# Patient Record
Sex: FEMALE | Race: WHITE | URBAN - METROPOLITAN AREA
[De-identification: names, ages, dates, MRNs, and addresses within clinical notes are randomized per-mention and may not be internally consistent; named-entity substitution may affect disease eponyms.]

---

## 2022-04-07 ENCOUNTER — OFFICE VISIT (OUTPATIENT)
Dept: URBAN - METROPOLITAN AREA CLINIC 6 | Facility: CLINIC | Age: 77
End: 2022-04-07
Payer: MEDICARE

## 2022-04-07 DIAGNOSIS — H43.393 OTHER VITREOUS OPACITIES, BILATERAL: ICD-10-CM

## 2022-04-07 DIAGNOSIS — H25.811 COMBINED FORMS OF AGE-RELATED CATARACT, RIGHT EYE: Primary | ICD-10-CM

## 2022-04-07 DIAGNOSIS — H11.153 PINGUECULA, BILATERAL: ICD-10-CM

## 2022-04-07 DIAGNOSIS — H26.492 OTHER SECONDARY CATARACT, LEFT EYE: ICD-10-CM

## 2022-04-07 DIAGNOSIS — H04.123 DRY EYE SYNDROME OF BILATERAL LACRIMAL GLANDS: ICD-10-CM

## 2022-04-07 PROCEDURE — 99204 OFFICE O/P NEW MOD 45 MIN: CPT | Performed by: OPTOMETRIST

## 2022-04-07 ASSESSMENT — INTRAOCULAR PRESSURE
OS: 12
OD: 13

## 2022-04-07 ASSESSMENT — KERATOMETRY
OS: 45.13
OD: 44.50

## 2022-04-07 NOTE — IMPRESSION/PLAN
Impression: Other secondary cataract, left eye: H26.492. Plan: Posterior capsular opacity Left eye (common clouding behind the intraocular lens implant) - At this time does not appear visually significant, so will hold off on intervention. Patient advised to contact us for any decrease vision, glare, or other visual problems.

## 2022-04-07 NOTE — IMPRESSION/PLAN
Impression: Combined forms of age-related cataract, right eye: H25.811. Plan: Cataract right eye- Discussed the risks, benefits, and alternatives of cataract surgery in the right eye. Given that we almost never use retrobulbar anesthesia, there is typically no need to stop any anticoagulant medications when a patient gets cataract surgery with us. In most cataract surgeries performed by us we place an antibiotic and steroid at the time of surgery so most likely will not need to use any prescription post-op drops. The patient stated a full understanding and a desire to proceed with the procedure. Biometry ordered for intraocular lens selection. Advised against driving until complete. Reviewed the increased risk of retinal detachment after cataract surgery and reviewed retinal detachment and general warnings and to contact us immediately should any of those develop. NOTE: toric OD if has astigmatism. Aim for distance. Pt has monovision prior to CE OS (OD-distance; OS-near). Currently, Restor IOL OS and not 100% happy with result.